# Patient Record
Sex: MALE | Race: WHITE | NOT HISPANIC OR LATINO | Employment: OTHER | URBAN - METROPOLITAN AREA
[De-identification: names, ages, dates, MRNs, and addresses within clinical notes are randomized per-mention and may not be internally consistent; named-entity substitution may affect disease eponyms.]

---

## 2023-02-23 VITALS
TEMPERATURE: 98.2 F | SYSTOLIC BLOOD PRESSURE: 112 MMHG | DIASTOLIC BLOOD PRESSURE: 67 MMHG | OXYGEN SATURATION: 99 % | RESPIRATION RATE: 16 BRPM | HEART RATE: 75 BPM

## 2023-04-27 ENCOUNTER — NURSING HOME VISIT (OUTPATIENT)
Dept: POST ACUTE CARE | Facility: EXTERNAL LOCATION | Age: 88
End: 2023-04-27
Payer: MEDICARE

## 2023-04-27 DIAGNOSIS — I50.30 HEART FAILURE WITH PRESERVED EJECTION FRACTION, UNSPECIFIED HF CHRONICITY (MULTI): ICD-10-CM

## 2023-04-27 DIAGNOSIS — F03.90 SENILE DEMENTIA, UNCOMPLICATED (MULTI): ICD-10-CM

## 2023-04-27 DIAGNOSIS — I48.0 AF (PAROXYSMAL ATRIAL FIBRILLATION) (MULTI): ICD-10-CM

## 2023-04-27 PROCEDURE — 99349 HOME/RES VST EST MOD MDM 40: CPT | Performed by: FAMILY MEDICINE

## 2023-04-27 NOTE — LETTER
Patient: Clarke Mathews  : 1934    Encounter Date: 2023    Resident seen in his [place] [address] -- [place]    CC: AL (Sriram Catesor) Recheck    : 1934  ALLERGY: PCN  Last SNF Admit H&P 2021  Full Code/CPR    S: 88 yo man with weakness, dementia, pAFIB, HFpEF, urinary obstruction with chronic Jack. No CP, SOB. Med List & Problem List reviewed.    O: VSS AFEB, 181# (down 10#). Awake, alert, confused, NAD. Chest cta. Heart rrr. Ext no c/c/e. OP mmm. Jack draining clear urine.    Labs: 21 Hgb 12.3->13, cr 0.73->0.8, k 4.1, Alb 3.1    10/29/20 MOCA 16    A/P:  # pAFIB: SR on propafenone, Eliquis, off BB.  # HFpEF -- euvolemic. continue to follow.  # Dementia: MOCA , AL.  # Urinary obstruction with obstructive nephropathy: continue chronic Jack, oxybutynin.  # MDD -- tolerating lexapro 20 qhs.  # GERD: PPI  # B/L hernia: not bothersome. Avoid surgery.  # Weakness with gait instability due to Knee OA with genovalgus deformity -- see 2022 F2F for Knee orthoses eval.      Electronically Signed By: Chandra Mayer MD   23  8:34 PM

## 2023-05-02 PROBLEM — I48.0 AF (PAROXYSMAL ATRIAL FIBRILLATION) (MULTI): Status: ACTIVE | Noted: 2023-05-02

## 2023-05-02 PROBLEM — I50.30 HEART FAILURE WITH PRESERVED EJECTION FRACTION (MULTI): Status: ACTIVE | Noted: 2023-05-02

## 2023-05-02 PROBLEM — F03.90 SENILE DEMENTIA, UNCOMPLICATED (MULTI): Status: ACTIVE | Noted: 2023-05-02

## 2023-05-02 NOTE — PROGRESS NOTES
Resident seen in his [place] [address] -- [place]    CC: AL (Sriram Rodriguez) Recheck    : 1934  ALLERGY: PCN  Last SNF Admit H&P 2021  Full Code/CPR    S: 89 yo man with weakness, dementia, pAFIB, HFpEF, urinary obstruction with chronic Jack. No CP, SOB. Med List & Problem List reviewed.    O: VSS AFEB, 181# (down 10#). Awake, alert, confused, NAD. Chest cta. Heart rrr. Ext no c/c/e. OP mmm. Jack draining clear urine.    Labs: 21 Hgb 12.3->13, cr 0.73->0.8, k 4.1, Alb 3.1    10/29/20 MOCA     A/P:  # pAFIB: SR on propafenone, Eliquis, off BB.  # HFpEF -- euvolemic. continue to follow.  # Dementia: MOCA , AL.  # Urinary obstruction with obstructive nephropathy: continue chronic Jack, oxybutynin.  # MDD -- tolerating lexapro 20 qhs.  # GERD: PPI  # B/L hernia: not bothersome. Avoid surgery.  # Weakness with gait instability due to Knee OA with genovalgus deformity -- see 2022 F2F for Knee orthoses eval.

## 2023-07-19 ENCOUNTER — HOME VISIT (OUTPATIENT)
Dept: POST ACUTE CARE | Facility: EXTERNAL LOCATION | Age: 88
End: 2023-07-19
Payer: MEDICARE

## 2023-07-19 DIAGNOSIS — N40.1 BENIGN PROSTATIC HYPERPLASIA WITH URINARY OBSTRUCTION: ICD-10-CM

## 2023-07-19 DIAGNOSIS — S22.42XA CLOSED FRACTURE OF MULTIPLE RIBS OF LEFT SIDE, INITIAL ENCOUNTER: Primary | ICD-10-CM

## 2023-07-19 DIAGNOSIS — K21.9 GASTROESOPHAGEAL REFLUX DISEASE, UNSPECIFIED WHETHER ESOPHAGITIS PRESENT: ICD-10-CM

## 2023-07-19 DIAGNOSIS — I48.0 AF (PAROXYSMAL ATRIAL FIBRILLATION) (MULTI): ICD-10-CM

## 2023-07-19 DIAGNOSIS — N32.81 OAB (OVERACTIVE BLADDER): ICD-10-CM

## 2023-07-19 DIAGNOSIS — N13.8 BENIGN PROSTATIC HYPERPLASIA WITH URINARY OBSTRUCTION: ICD-10-CM

## 2023-07-19 DIAGNOSIS — F32.A DEPRESSION, UNSPECIFIED DEPRESSION TYPE: ICD-10-CM

## 2023-07-19 PROCEDURE — 99349 HOME/RES VST EST MOD MDM 40: CPT | Performed by: NURSE PRACTITIONER

## 2023-07-19 RX ORDER — TRAMADOL HYDROCHLORIDE 50 MG/1
50 TABLET ORAL EVERY 6 HOURS PRN
Qty: 10 TABLET | Refills: 0 | Status: SHIPPED | OUTPATIENT
Start: 2023-07-19 | End: 2023-07-24

## 2023-07-20 ENCOUNTER — NURSING HOME VISIT (OUTPATIENT)
Dept: PRIMARY CARE | Facility: CLINIC | Age: 88
End: 2023-07-20
Payer: MEDICARE

## 2023-07-20 DIAGNOSIS — F03.92 DEMENTIA WITH PSYCHOTIC DISTURBANCE, UNSPECIFIED DEMENTIA SEVERITY, UNSPECIFIED DEMENTIA TYPE (MULTI): ICD-10-CM

## 2023-07-20 DIAGNOSIS — N40.0 BENIGN PROSTATIC HYPERPLASIA, UNSPECIFIED WHETHER LOWER URINARY TRACT SYMPTOMS PRESENT: ICD-10-CM

## 2023-07-20 PROCEDURE — 99349 HOME/RES VST EST MOD MDM 40: CPT | Performed by: FAMILY MEDICINE

## 2023-07-21 PROBLEM — F03.92: Status: ACTIVE | Noted: 2023-07-21

## 2023-07-21 PROBLEM — N40.0 BENIGN PROSTATIC HYPERPLASIA: Status: ACTIVE | Noted: 2023-07-21

## 2023-07-23 NOTE — PROGRESS NOTES
*Provider Impression*  Patient is a 89 year old male who is seen today for management of multiple medical problems  #Rib fractures - lidocaaine patch daily, add tramadol 50mg BID and q6h PRN  #OAB / BPH - oxybutynin ER 10mg daily, silva  #A-fib - propafenone 150mg TID, apixaban 5mg BID  #GERD - pantoprazole 40mg BID, zofran 4mg q6h PRN  #Depression - lexapro 20mg daily  Follow up as needed      *Chief Complaint*  rib fractures      *History of Present Illness*  Pt is an 89 y/[place] [facility] below who is seen today for f/u and management of multiple medical problems.    His labs were ok. He had a fall without head injury but landed on his side. XR showed rib fractures.    He is seen lying in bed today and is unable to rate pain but reports pain worse w/ deep inspiration, no f/c, sweats, cough, CP, SOB, n/v, constipation, diarrhea, or any other new c/o presently.     Allergies - PCN  PMH - osteomyelitis, A-fib, HTN, BPH, CHF, OA, edema, PVD, melanoma  PSH - abdominal surgery, biopsy, cholecystectomy, foot surgery, knee replacement, fem-pop atherectomy and stent  FH - Father had CVA, parents had heart disease  SocHx - No EtOH, Never smoker      *Review of Systems*  All other systems reviewed are negative except as noted in the HPI     *Vitals*  Date: 7/19/23 - T: 97.5 P: 71 R:  BP:  SpO2: 95% on RA      *Results/Data  CBC - Date: 4/28/23 WBC: 5.8 HGB: 10.8 HCT: 36.2 PLT: 231 ;   BMP - Date: 4/28/23 Na: 136 K: 3.8 Cl: 103 CO2: 29 BUN: 19 Cr: 0.81 Glu: 79 Ca: 8.3 ;   LFT - Date: 4/28/23 AST: 13 ALT: 9 ALP: 93 TBili: 0.4 ;   Coags - Date: INR: PT:     *Physical Exam*  Gen: (+) NAD, (+) well-appearing  HEENT: (+) normocephalic, (+) MMM  Neck: (+) supple  Lungs: (+) CTAB, (-) wheezes, (-) rales, (-) rhonchi  Heart: (+) RRR, (+) S1 S2, (-) murmurs  Pulses: (+) palpable  Abd: (+) soft, (+) NT, (+) ND, (+) BS+  Ext: (-) edema, (-) deformity  MSK: (-) joint swelling  Skin: (+) warm, (+) dry,  (-) rash  Neuro: (+) follows commands, (-) tremor, (+) alert

## 2023-07-28 DIAGNOSIS — S22.42XA CLOSED FRACTURE OF MULTIPLE RIBS OF LEFT SIDE, INITIAL ENCOUNTER: ICD-10-CM

## 2023-07-28 RX ORDER — TRAMADOL HYDROCHLORIDE 50 MG/1
50 TABLET ORAL EVERY 6 HOURS PRN
Qty: 28 TABLET | Refills: 0 | Status: SHIPPED | OUTPATIENT
Start: 2023-07-28 | End: 2023-08-04

## 2023-07-31 NOTE — PROGRESS NOTES
Resident seen in his [place] [address] -- [place]    CC: AL (Sriram Rodriguez) Recheck    : 1934  ALLERGY: PCN  Last SNF Admit H&P 2021  Full Code/CPR    S: 89 yo man with weakness, dementia, pAFIB, HFpEF, urinary obstruction with chronic Jack. Since last visit has had significant decline -- with weakness, falls, rib contusions. Increased visual hallucinations. No CP, SOB. Med List & Problem List reviewed.    O: VSS AFEB, 186# (up 5#). Awake, alert, confused, , aphasic in NAD. Chest cta. Heart rrr. Ext no c/c/e. OP mmm. Jack draining clear urine.    Labs: 21 Hgb 12.3->13, cr 0.73->0.8, k 4.1, Alb 3.1    10/29/20 MOCA 16    A/P:  # pAFIB: SR on propafenone, Eliquis, off BB.  # HFpEF -- euvolemic. continue to follow.  # Progressive Dementia with hallucinations: MOCA , AL. DC anticholinergics.  # Urinary obstruction with obstructive nephropathy: continue chronic Jack, DC oxybutynin d/t hallucinations.  # MDD -- tolerating lexapro 20 qhs.  # GERD: PPI  # B/L hernia: not bothersome. Avoid surgery.  # Weakness with gait instability due to Knee OA with genovalgus deformity -- see 2022 F2F for Knee orthoses eval.

## 2023-08-03 ENCOUNTER — NURSING HOME VISIT (OUTPATIENT)
Dept: POST ACUTE CARE | Facility: EXTERNAL LOCATION | Age: 88
End: 2023-08-03
Payer: MEDICARE

## 2023-08-03 DIAGNOSIS — I48.0 AF (PAROXYSMAL ATRIAL FIBRILLATION) (MULTI): ICD-10-CM

## 2023-08-03 DIAGNOSIS — F03.B2 MODERATE DEMENTIA WITH PSYCHOTIC DISTURBANCE, UNSPECIFIED DEMENTIA TYPE (MULTI): ICD-10-CM

## 2023-08-03 DIAGNOSIS — I69.320 APHASIA DUE TO RECENT CEREBROVASCULAR ACCIDENT (CVA): ICD-10-CM

## 2023-08-03 PROCEDURE — 99349 HOME/RES VST EST MOD MDM 40: CPT | Performed by: FAMILY MEDICINE

## 2023-08-03 NOTE — LETTER
Patient: JOSE DE JESUS Mathews  : 1934    Encounter Date: 2023    Resident seen in his [place] apartment 8/3/23 -- [place]    CC: AL (Sriram Rodriguez) 2 week Recheck/cognitive eval.    : 1934  ALLERGY: PCN  Last SNF Admit H&P 2021  Full Code/CPR    S: 89 yo man with weakness, dementia, pAFIB, HFpEF, urinary obstruction with chronic Jack. Since last visit has had continued decline -- decreased po intake, increased confusion with continued visual hallucinations. Continued weakness, with c/o pain from recent fall with rib contusions. No CP, SOB. Med List & Problem List reviewed.    O: VSS AFEB, 186# (23). Awake, alert, but confused, with non-sensical speech between stretches of aphasic. NAD. Chest cta. Heart rrr. Ext no c/c/e. OP mmm. Jack draining clear urine.    Labs: 21 Hgb 12.3->13, cr 0.73->0.8, k 4.1, Alb 3.1    10/29/20 MOCA     A/P:  # pAFIB: SR on propafenone, Eliquis, off BB.  # HFpEF -- euvolemic. continue to follow.  # Aphasia and progressive weakness due to recent CVA. 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  # Progressive Dementia with hallucinations: MOCA , AL. OFF anticholinergics. Son with medical POA in place. Guardianship paperwork completed to allow son to obtain durable POA to handle finances.  # Urinary obstruction with obstructive nephropathy: continue chronic Jack, OFF oxybutynin d/t hallucinations.  # MDD -- tolerating lexapro 20 qhs.  # GERD: PPI  # B/L hernia: not bothersome. Avoid surgery.  # Rib contusion/OA: tylenol, tramadol, lidocaine patch.  # Weakness with gait instability due to Knee OA with genovalgus deformity -- see 2022 F2F for Knee orthoses eval.      Electronically Signed By: Chandra Mayer MD   23  2:49 PM

## 2023-08-04 DIAGNOSIS — M19.90 OSTEOARTHRITIS, UNSPECIFIED OSTEOARTHRITIS TYPE, UNSPECIFIED SITE: Primary | ICD-10-CM

## 2023-08-04 PROBLEM — I69.320 APHASIA DUE TO RECENT CEREBROVASCULAR ACCIDENT (CVA): Status: ACTIVE | Noted: 2023-08-04

## 2023-08-04 RX ORDER — HYDROCODONE BITARTRATE AND ACETAMINOPHEN 5; 325 MG/1; MG/1
1 TABLET ORAL EVERY 4 HOURS PRN
Qty: 90 TABLET | Refills: 0 | Status: SHIPPED | OUTPATIENT
Start: 2023-08-04 | End: 2023-09-03

## 2023-08-04 RX ORDER — MORPHINE SULFATE 20 MG/ML
10 SOLUTION ORAL EVERY 2 HOUR PRN
Qty: 30 ML | Refills: 0 | Status: SHIPPED | OUTPATIENT
Start: 2023-08-04 | End: 2023-09-18 | Stop reason: SDUPTHER

## 2023-08-04 NOTE — PROGRESS NOTES
Resident seen in his [place] apartment 8/3/23 -- [place]    CC: AL (Sriram Rodriguez) 2 week Recheck/cognitive eval.    : 1934  ALLERGY: PCN  Last SNF Admit H&P 2021  Full Code/CPR    S: 88 yo man with weakness, dementia, pAFIB, HFpEF, urinary obstruction with chronic Jack. Since last visit has had continued decline -- decreased po intake, increased confusion with continued visual hallucinations. Continued weakness, with c/o pain from recent fall with rib contusions. No CP, SOB. Med List & Problem List reviewed.    O: VSS AFEB, 186# (23). Awake, alert, but confused, with non-sensical speech between stretches of aphasic. NAD. Chest cta. Heart rrr. Ext no c/c/e. OP mmm. Jack draining clear urine.    Labs: 21 Hgb 12.3->13, cr 0.73->0.8, k 4.1, Alb 3.1    10/29/20 MOCA     A/P:  # pAFIB: SR on propafenone, Eliquis, off BB.  # HFpEF -- euvolemic. continue to follow.  # Aphasia and progressive weakness due to recent CVA. 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  # Progressive Dementia with hallucinations: MOCA , AL. OFF anticholinergics. Son with medical POA in place. Guardianship paperwork completed to allow son to obtain durable POA to handle finances.  # Urinary obstruction with obstructive nephropathy: continue chronic Jack, OFF oxybutynin d/t hallucinations.  # MDD -- tolerating lexapro 20 qhs.  # GERD: PPI  # B/L hernia: not bothersome. Avoid surgery.  # Rib contusion/OA: tylenol, tramadol, lidocaine patch.  # Weakness with gait instability due to Knee OA with genovalgus deformity -- see 2022 F2F for Knee orthoses eval.

## 2023-08-04 NOTE — PROGRESS NOTES
D/w [facility] RN -- change tramadol to norco for better pain control. I have personally reviewed the [facility] report for this patient.  I have considered the risks of abuse, dependence, addiction, and diversion.  I believe that it is clinically appropriate for this patient to be prescribed this medication based on the documented diagnosis.  Chandra Mayer MD

## 2023-09-18 DIAGNOSIS — M19.90 OSTEOARTHRITIS, UNSPECIFIED OSTEOARTHRITIS TYPE, UNSPECIFIED SITE: ICD-10-CM

## 2023-09-18 DIAGNOSIS — R45.1 TERMINAL RESTLESSNESS: Primary | ICD-10-CM

## 2023-09-18 RX ORDER — MORPHINE SULFATE 20 MG/ML
10 SOLUTION ORAL
Qty: 30 ML | Refills: 0 | Status: SHIPPED | OUTPATIENT
Start: 2023-09-18 | End: 2023-10-18

## 2023-09-18 RX ORDER — LORAZEPAM 0.5 MG/1
0.5 TABLET ORAL EVERY 4 HOURS PRN
Qty: 30 TABLET | Refills: 5 | Status: SHIPPED | OUTPATIENT
Start: 2023-09-18 | End: 2023-10-18

## 2023-09-18 NOTE — PROGRESS NOTES
Add ativan. Increase frequency of roxanol for progressive terminal restlessness.  D/W Ramila Boles RN with [facility]. [place]